# Patient Record
Sex: FEMALE | Race: WHITE | NOT HISPANIC OR LATINO | URBAN - METROPOLITAN AREA
[De-identification: names, ages, dates, MRNs, and addresses within clinical notes are randomized per-mention and may not be internally consistent; named-entity substitution may affect disease eponyms.]

---

## 2024-11-15 ENCOUNTER — APPOINTMENT (RX ONLY)
Dept: URBAN - METROPOLITAN AREA CLINIC 111 | Facility: CLINIC | Age: 39
Setting detail: DERMATOLOGY
End: 2024-11-15

## 2024-11-15 DIAGNOSIS — I87.2 VENOUS INSUFFICIENCY (CHRONIC) (PERIPHERAL): ICD-10-CM | Status: INADEQUATELY CONTROLLED

## 2024-11-15 PROCEDURE — 99203 OFFICE O/P NEW LOW 30 MIN: CPT

## 2024-11-15 PROCEDURE — ? MEDICAL CONSULTATION: VENOUS DISEASE

## 2024-11-15 NOTE — PROCEDURE: MEDICAL CONSULTATION: VENOUS DISEASE
Left Leg Circumference: medium
Include Ceap In The Note?: Yes
Right Leg Venous Edema: 0- None
Pathophysiology Set 2: Pr - Reflux
Left Leg Varicose Veins: 2- Multiple: GS varicose veins confined to calf or thigh
Clinical Classification Set 2: C2 - varicose veins
Left Leg Venous Hyperpigmentation: 0- None or focal low intensity (tan)
Right Leg Compression Therapy: 2- Wears elastic stocking most days
Detail Level: Detailed
Right Dorsalis Pedis Pulse: 2 (Easily palpable)
Etiology Set 2: Ec - Congenital
Right Leg: Peripheral Vascular Disease?: No
Anatomy Set 2: As - Superficial Veins
Left Leg Pain: 3- Daily, severe activity limitation or requiring regular analgesic use

## 2024-11-15 NOTE — HPI: VARICOSE VEINS (VARICOSITIES)
How Severe Are They?: moderate
Is This A New Presentation, Or A Follow-Up?: Varicose Veins
Additional History: PT has neuropathy

## 2025-03-07 ENCOUNTER — APPOINTMENT (OUTPATIENT)
Dept: URBAN - METROPOLITAN AREA CLINIC 111 | Facility: CLINIC | Age: 40
Setting detail: DERMATOLOGY
End: 2025-03-07

## 2025-03-07 DIAGNOSIS — I87.2 VENOUS INSUFFICIENCY (CHRONIC) (PERIPHERAL): ICD-10-CM

## 2025-03-07 PROCEDURE — 93970 EXTREMITY STUDY: CPT

## 2025-03-07 PROCEDURE — ? LOWER EXTREMITY DOPPLER US

## 2025-03-07 PROCEDURE — ? FOLLOW UP ORDERS

## 2025-03-07 PROCEDURE — 99213 OFFICE O/P EST LOW 20 MIN: CPT

## 2025-03-07 NOTE — PROCEDURE: LOWER EXTREMITY DOPPLER US
Size In Mm: 2.4
Size Options: Use Free Text
Size In Mm: 2.6
Size In Mm: 6.1
Recommend Cosmetic Sclerotherapy: No
See Attached Documentation Text: Please refer to the attached ultrasound documentation for complete details of the procedure and the venous findings.
Size In Mm: 3.2
Continue Post-Procedural Therapy: Yes
Detail Level: Simple
Size In Mm: 2.5
Treatment Number (Optional): Venous: Negative
Size In Mm: 2.2
Size In Mm: 2.0
Size In Mm: 3.1
Size In Mm: 5.4
Right Intraluminal Thrombus- Yes: The right deep veins were imaged from the level of the common femoral vein to the posterior tibial veins. There was evidence of intraluminal thrombus as noted above.

## 2025-03-07 NOTE — PROCEDURE: FOLLOW UP ORDERS
Detail Level: Zone
Follow-Up (Free Text): She will follow up PRN
Follow-Up Preamble: Current US shows no axial reflux.\\n\\nNo vascular cause for her pain.